# Patient Record
Sex: FEMALE | Race: WHITE | URBAN - METROPOLITAN AREA
[De-identification: names, ages, dates, MRNs, and addresses within clinical notes are randomized per-mention and may not be internally consistent; named-entity substitution may affect disease eponyms.]

---

## 2018-07-05 ENCOUNTER — OUTPATIENT (OUTPATIENT)
Dept: OUTPATIENT SERVICES | Facility: HOSPITAL | Age: 43
LOS: 1 days | Discharge: HOME | End: 2018-07-05

## 2018-07-05 DIAGNOSIS — Z12.31 ENCOUNTER FOR SCREENING MAMMOGRAM FOR MALIGNANT NEOPLASM OF BREAST: ICD-10-CM

## 2018-08-30 PROBLEM — Z00.00 ENCOUNTER FOR PREVENTIVE HEALTH EXAMINATION: Status: ACTIVE | Noted: 2018-08-30

## 2018-09-04 ENCOUNTER — OUTPATIENT (OUTPATIENT)
Dept: OUTPATIENT SERVICES | Facility: HOSPITAL | Age: 43
LOS: 1 days | Discharge: HOME | End: 2018-09-04
Payer: MEDICAID

## 2018-09-04 ENCOUNTER — RESULT REVIEW (OUTPATIENT)
Age: 43
End: 2018-09-04

## 2018-09-04 VITALS
DIASTOLIC BLOOD PRESSURE: 59 MMHG | HEART RATE: 73 BPM | SYSTOLIC BLOOD PRESSURE: 109 MMHG | OXYGEN SATURATION: 98 % | WEIGHT: 138.89 LBS | RESPIRATION RATE: 18 BRPM | HEIGHT: 64.96 IN | TEMPERATURE: 99 F

## 2018-09-04 VITALS
DIASTOLIC BLOOD PRESSURE: 69 MMHG | SYSTOLIC BLOOD PRESSURE: 107 MMHG | RESPIRATION RATE: 18 BRPM | OXYGEN SATURATION: 100 % | HEART RATE: 82 BPM

## 2018-09-04 DIAGNOSIS — Z98.891 HISTORY OF UTERINE SCAR FROM PREVIOUS SURGERY: Chronic | ICD-10-CM

## 2018-09-04 PROCEDURE — 58558 HYSTEROSCOPY BIOPSY: CPT

## 2018-09-04 RX ORDER — IBUPROFEN 200 MG
1 TABLET ORAL
Qty: 12 | Refills: 0 | OUTPATIENT
Start: 2018-09-04 | End: 2018-09-06

## 2018-09-04 RX ORDER — MORPHINE SULFATE 50 MG/1
2 CAPSULE, EXTENDED RELEASE ORAL
Qty: 0 | Refills: 0 | Status: DISCONTINUED | OUTPATIENT
Start: 2018-09-04 | End: 2018-09-04

## 2018-09-04 RX ORDER — SODIUM CHLORIDE 9 MG/ML
1000 INJECTION, SOLUTION INTRAVENOUS
Qty: 0 | Refills: 0 | Status: DISCONTINUED | OUTPATIENT
Start: 2018-09-04 | End: 2018-09-19

## 2018-09-04 RX ORDER — IRON,CARBONYL 45 MG
1 TABLET ORAL
Qty: 0 | Refills: 0 | COMMUNITY

## 2018-09-04 RX ORDER — CHOLECALCIFEROL (VITAMIN D3) 125 MCG
0 CAPSULE ORAL
Qty: 0 | Refills: 0 | COMMUNITY

## 2018-09-04 RX ORDER — ONDANSETRON 8 MG/1
4 TABLET, FILM COATED ORAL ONCE
Qty: 0 | Refills: 0 | Status: DISCONTINUED | OUTPATIENT
Start: 2018-09-04 | End: 2018-09-19

## 2018-09-04 RX ORDER — OXYCODONE AND ACETAMINOPHEN 5; 325 MG/1; MG/1
1 TABLET ORAL EVERY 4 HOURS
Qty: 0 | Refills: 0 | Status: DISCONTINUED | OUTPATIENT
Start: 2018-09-04 | End: 2018-09-04

## 2018-09-04 RX ADMIN — SODIUM CHLORIDE 100 MILLILITER(S): 9 INJECTION, SOLUTION INTRAVENOUS at 13:44

## 2018-09-04 RX ADMIN — SODIUM CHLORIDE 100 MILLILITER(S): 9 INJECTION, SOLUTION INTRAVENOUS at 13:15

## 2018-09-04 NOTE — PRE-ANESTHESIA EVALUATION ADULT - NSANTHOSAYNRD_GEN_A_CORE
No. RAISA screening performed.  STOP BANG Legend: 0-2 = LOW Risk; 3-4 = INTERMEDIATE Risk; 5-8 = HIGH Risk

## 2018-09-04 NOTE — BRIEF OPERATIVE NOTE - PROCEDURE
<<-----Click on this checkbox to enter Procedure Dilation and curettage  09/04/2018    Active  MMOWZOON

## 2018-09-04 NOTE — ASU DISCHARGE PLAN (ADULT/PEDIATRIC). - MEDICATION SUMMARY - MEDICATIONS TO TAKE
I will START or STAY ON the medications listed below when I get home from the hospital:    ibuprofen 600 mg oral tablet  -- 1 tab(s) by mouth every 6 hours   -- Do not take this drug if you are pregnant.  It is very important that you take or use this exactly as directed.  Do not skip doses or discontinue unless directed by your doctor.  May cause drowsiness or dizziness.  Obtain medical advice before taking any non-prescription drugs as some may affect the action of this medication.  Take with food or milk.    -- Indication: For take only if you are having pain

## 2018-09-04 NOTE — BRIEF OPERATIVE NOTE - OPERATION/FINDINGS
examination under anesthesia revealed a normal sized, anteverted uterus with normal contour and normal bilateral adnexa; hysteroscopy showed pedunculated polyps on the anterior and posterior wall, uterine cavity and bilateral tubal ostia appeared to be normal.

## 2018-09-04 NOTE — H&P PST ADULT - ASSESSMENT
41yo  with Abnormal uterine bleeding, undergoing D+C, hysteroscopy today. Follow up in 2 weeks with pelvic rest instructions.

## 2018-09-04 NOTE — ASU DISCHARGE PLAN (ADULT/PEDIATRIC). - SPECIAL INSTRUCTIONS
nothing in the vagina for 2 weeks, no tampons, no douching, no baths, no sex. Showers are fine.   Go to the emergency room if you have a temperature greater than 100.4, worsening abdominal pain or increased vaginal bleeding

## 2018-09-04 NOTE — H&P PST ADULT - HISTORY OF PRESENT ILLNESS
41yo  with history of abnormal uterine bleeding, reports prolonged menstrual cycles with increased flow, undergoing D+C Hysteroscopy today. Patient has a history of CSX2, and hemorrhoidectomy, and denies SAB/ETOP. No significant PMH.

## 2018-09-04 NOTE — H&P PST ADULT - ATTENDING COMMENTS
pt seen and examined   pt with hx of dub  thickened ee stripe, likely a polyp  for hysteroscopy and d&c

## 2018-09-04 NOTE — CHART NOTE - NSCHARTNOTEFT_GEN_A_CORE
PACU ANESTHESIA ADMISSION NOTE      Procedure: D&c hysteroscopy  Post op diagnosis:  uterine polyps    ____  Intubated  TV:______       Rate: ______      FiO2: ______    _x___  Patent Airway    ___x_  Full return of protective reflexes    ____x  Full recovery from anesthesia / back to baseline     Vitals:   T:      97     R:  14                BP:        126/76          Sat:     99              P: 105      Mental Status:  ___x_ Awake   _____ Alert   _____ Drowsy   _____ Sedated    Nausea/Vomiting:  ___x_ NO  ______Yes,   See Post - Op Orders          Pain Scale (0-10):  _____    Treatment: ____ None    ___x_ See Post - Op/PCA Orders    Post - Operative Fluids:   ____ Oral   __x__See Post - Op Orders    Plan: Discharge:   __x__Home       _____Floor     _____Critical Care    _____  Other:_________________    Comments: Discharge home when criteria met

## 2018-09-05 LAB — SURGICAL PATHOLOGY STUDY: SIGNIFICANT CHANGE UP

## 2018-09-07 DIAGNOSIS — N84.0 POLYP OF CORPUS UTERI: ICD-10-CM

## 2018-09-07 DIAGNOSIS — N93.9 ABNORMAL UTERINE AND VAGINAL BLEEDING, UNSPECIFIED: ICD-10-CM

## 2018-09-07 DIAGNOSIS — N92.0 EXCESSIVE AND FREQUENT MENSTRUATION WITH REGULAR CYCLE: ICD-10-CM

## 2019-10-02 ENCOUNTER — OUTPATIENT (OUTPATIENT)
Dept: OUTPATIENT SERVICES | Facility: HOSPITAL | Age: 44
LOS: 1 days | Discharge: HOME | End: 2019-10-02
Payer: MEDICAID

## 2019-10-02 DIAGNOSIS — Z12.31 ENCOUNTER FOR SCREENING MAMMOGRAM FOR MALIGNANT NEOPLASM OF BREAST: ICD-10-CM

## 2019-10-02 DIAGNOSIS — Z98.891 HISTORY OF UTERINE SCAR FROM PREVIOUS SURGERY: Chronic | ICD-10-CM

## 2019-10-02 PROCEDURE — 77063 BREAST TOMOSYNTHESIS BI: CPT | Mod: 26

## 2019-10-02 PROCEDURE — 77067 SCR MAMMO BI INCL CAD: CPT | Mod: 26

## 2020-01-01 NOTE — ASU DISCHARGE PLAN (ADULT/PEDIATRIC). - COMMENTS
Benefits, risks, and possible complications of procedure explained to patient/caregiver who verbalized understanding and gave written consent.
Make an appointment with Dr. Tijerina or Dr. Pittman

## 2020-01-22 NOTE — ASU PREOP CHECKLIST - ALLERGIES REVIEWED
Detail Level: Detailed Add 26783 Cpt? (Important Note: In 2017 The Use Of 56589 Is Being Tracked By Cms To Determine Future Global Period Reimbursement For Global Periods): no Additional Comments: PT LOOKS TO HAVE DISTAL TIP NECROSIS. WOUND CARE DISCUSSED. WILL SEE THE PATIENT BACK IN 6 WEEKS FOR EVAL done

## 2021-12-28 ENCOUNTER — EMERGENCY (EMERGENCY)
Facility: HOSPITAL | Age: 46
LOS: 0 days | Discharge: HOME | End: 2021-12-28
Attending: EMERGENCY MEDICINE | Admitting: EMERGENCY MEDICINE
Payer: COMMERCIAL

## 2021-12-28 VITALS
DIASTOLIC BLOOD PRESSURE: 86 MMHG | HEART RATE: 83 BPM | HEIGHT: 64.96 IN | SYSTOLIC BLOOD PRESSURE: 128 MMHG | RESPIRATION RATE: 20 BRPM | TEMPERATURE: 99 F | OXYGEN SATURATION: 98 % | WEIGHT: 156.09 LBS

## 2021-12-28 DIAGNOSIS — J02.9 ACUTE PHARYNGITIS, UNSPECIFIED: ICD-10-CM

## 2021-12-28 DIAGNOSIS — M79.10 MYALGIA, UNSPECIFIED SITE: ICD-10-CM

## 2021-12-28 DIAGNOSIS — R05.1 ACUTE COUGH: ICD-10-CM

## 2021-12-28 DIAGNOSIS — Z98.891 HISTORY OF UTERINE SCAR FROM PREVIOUS SURGERY: Chronic | ICD-10-CM

## 2021-12-28 DIAGNOSIS — U07.1 COVID-19: ICD-10-CM

## 2021-12-28 LAB — SARS-COV-2 RNA SPEC QL NAA+PROBE: DETECTED

## 2021-12-28 PROCEDURE — 99285 EMERGENCY DEPT VISIT HI MDM: CPT

## 2021-12-28 RX ORDER — DEXAMETHASONE 0.5 MG/5ML
10 ELIXIR ORAL ONCE
Refills: 0 | Status: COMPLETED | OUTPATIENT
Start: 2021-12-28 | End: 2021-12-28

## 2021-12-28 RX ADMIN — Medication 10 MILLIGRAM(S): at 12:27

## 2021-12-28 NOTE — ED PROVIDER NOTE - NSFOLLOWUPCLINICS_GEN_ALL_ED_FT
[facility] [place] [address]  Phone: [phone]  Fax:

## 2021-12-28 NOTE — ED PROVIDER NOTE - PROGRESS NOTE DETAILS
ATTENDING NOTE:   47 y/o F presents for evaluation of myalgias and sore throat. On exam: NAD, OP clear without erythema, CTAB, S1S2, soft NT abdomen, grossly neurologically intact.

## 2021-12-28 NOTE — ED PROVIDER NOTE - PATIENT PORTAL LINK FT
You can access the FollowMyHealth Patient Portal offered by [facility] by registering at the following website: [email or web address]. By joining [employer]’s FollowMyHealth portal, you will also be able to view your health information using other applications (apps) compatible with our system.

## 2021-12-28 NOTE — ED PROVIDER NOTE - OBJECTIVE STATEMENT
47 y/o p/w cough, sore throat, myalgias x 2 days, persistent, denies ear pain, abdominal pain, n/v/d, respiratory sx, change in bowel habits or urinary sx, rash or other associated complaints at present.

## 2021-12-28 NOTE — ED PROVIDER NOTE - NSFOLLOWUPINSTRUCTIONS_ED_ALL_ED_FT
COVID-19 (Coronavirus Disease 2019)    WHAT YOU NEED TO KNOW:    COVID-19 is the disease caused by a coronavirus first discovered in December 2019. Coronaviruses generally cause upper respiratory (nose, throat, and lung) infections, such as a cold. The 2019 virus spreads quickly and easily. It can be spread starting 2 to 3 days before symptoms even begin.    DISCHARGE INSTRUCTIONS:    Call your local emergency number (911 in the ) if:   •You have trouble breathing or shortness of breath at rest.      •You have chest pain or pressure that lasts longer than 5 minutes.      •You become confused or hard to wake.      •Your lips or face are blue.      Return to the emergency department if:   •You have a fever of 104°F (40°C) or higher.          Call your doctor if:   •You have symptoms of COVID-19.      •You have questions or concerns about your condition or care.      Medicines: You may need any of the following:   •Decongestants help reduce nasal congestion and help you breathe more easily. If you take decongestant pills, they may make you feel restless or cause problems with your sleep. Do not use decongestant sprays for more than a few days.      •Cough suppressants help reduce coughing. Ask your healthcare provider which type of cough medicine is best for you.      •Acetaminophen decreases pain and fever. It is available without a doctor's order. Ask how much to take and how often to take it. Follow directions. Read the labels of all other medicines you are using to see if they also contain acetaminophen, or ask your doctor or pharmacist. Acetaminophen can cause liver damage if not taken correctly. Do not use more than 4 grams (4,000 milligrams) total of acetaminophen in one day.       •NSAIDs, such as ibuprofen, help decrease swelling, pain, and fever. This medicine is available with or without a doctor's order. NSAIDs can cause stomach bleeding or kidney problems in certain people. If you take blood thinner medicine, always ask your healthcare provider if NSAIDs are safe for you. Always read the medicine label and follow directions.      •Blood thinners help prevent blood clots. Clots can cause strokes, heart attacks, and death. The following are general safety guidelines to follow while you are taking a blood thinner:?Watch for bleeding and bruising while you take blood thinners. Watch for bleeding from your gums or nose. Watch for blood in your urine and bowel movements. Use a soft washcloth on your skin, and a soft toothbrush to brush your teeth. This can keep your skin and gums from bleeding. If you shave, use an electric shaver. Do not play contact sports.       ?Tell your dentist and other healthcare providers that you take a blood thinner. Wear a bracelet or necklace that says you take this medicine.       ?Do not start or stop any other medicines unless your healthcare provider tells you to. Many medicines cannot be used with blood thinners.      ?Take your blood thinner exactly as prescribed by your healthcare provider. Do not skip does or take less than prescribed. Tell your provider right away if you forget to take your blood thinner, or if you take too much.      ?Warfarin is a blood thinner that you may need to take. The following are things you should be aware of if you take warfarin: ?Foods and medicines can affect the amount of warfarin in your blood. Do not make major changes to your diet while you take warfarin. Warfarin works best when you eat about the same amount of vitamin K every day. Vitamin K is found in green leafy vegetables and certain other foods. Ask for more information about what to eat when you are taking warfarin.      ?You will need to see your healthcare provider for follow-up visits when you are on warfarin. You will need regular blood tests. These tests are used to decide how much medicine you need.         •Take your medicine as directed. Contact your healthcare provider if you think your medicine is not helping or if you have side effects. Tell him or her if you are allergic to any medicine. Keep a list of the medicines, vitamins, and herbs you take. Include the amounts, and when and why you take them. Bring the list or the pill bottles to follow-up visits. Carry your medicine list with you in case of an emergency.      What you need to know about variants: The virus has changed into several new forms (called variants) since it was discovered. The variants may be more contagious (easily spread) than the original form. Some may also cause more severe illness than others.    What you need to know about COVID-19 vaccines: Healthcare providers recommend a COVID-19 vaccine, even if you have already had COVID-19. You are considered fully vaccinated against COVID-19 two weeks after the final dose of any COVID-19 vaccine. Let your healthcare provider know when you have received the final dose of the vaccine. Make a copy of your vaccination card. Keep the original with you in case you need to show it. Keep the copy in a safe place.  •COVID-19 vaccines are given as a shot in 1 or 2 doses. The vaccine is recommended for everyone 12 years or older.      •A third dose is recommended for adults with a weakened immune system who get a 2-dose vaccine. The third dose is given at least 28 days after the second.      •A booster (additional) dose is being recommended for other people as well. This is usually given 6 months after the initial doses are complete. Continue social distancing and other measures, even after you get the vaccine. Although it is not common, you can become infected after you get the vaccine. You may also be able to pass the virus to others without knowing you are infected.      •After you get the vaccine, check local, national, and international travel rules. You may need to be tested before you travel. Some countries require proof of a negative test before you travel. You may also need to quarantine after you return.      How the 2019 coronavirus spreads:   •Droplets are the main way all coronaviruses spread. The virus travels in droplets that form when a person talks, sings, coughs, or sneezes. The droplets can also float in the air for minutes or hours. Infection happens when you breathe in the droplets or get them in your eyes or nose. Close personal contact with an infected person increases your risk for infection. This means being within 6 feet (2 meters) of the person for at least 15 minutes over 24 hours.      •Person-to-person contact can spread the virus. For example, a person with the virus on his or her hands can spread it by shaking hands with someone.      •The virus can stay on objects and surfaces for up to 3 days. You may become infected by touching the object or surface and then touching your eyes or mouth.      Help lower the risk for COVID-19:   •Wash your hands often throughout the day. Use soap and water. Rub your soapy hands together, lacing your fingers, for at least 20 seconds. Rinse with warm, running water. Dry your hands with a clean towel or paper towel. Use hand  that contains alcohol if soap and water are not available. Teach children how to wash their hands and use hand .  Handwashing           •Cover sneezes and coughs. Turn your face away and cover your mouth and nose with a tissue. Throw the tissue away. Use the bend of your arm if a tissue is not available. Then wash your hands well with soap and water or use hand . Teach children how to cover a cough or sneeze.      •Wear a face covering (mask) when needed. Use a cloth covering with at least 2 layers. You can also create layers by putting a cloth covering over a disposable non-medical mask. Cover your mouth and your nose.  How to Wear a Face Covering (Mask)           •Follow worldwide, national, and local social distancing guidelines. Keep at least 6 feet (2 meters) between you and others.      •Try not to touch your face. If you get the virus on your hands, you can transfer it to your eyes, nose, or mouth and become infected. You can also transfer it to objects, surfaces, or people.      •Clean and disinfect high-touch surfaces and objects often. Use disinfecting wipes, or make a solution of 4 teaspoons of bleach in 1 quart (4 cups) of water.      •Ask about other vaccines you may need. Get the influenza (flu) vaccine as soon as recommended each year, usually starting in September or October. Get the pneumonia vaccine if recommended. Your healthcare provider can tell you if you should also get other vaccines, and when to get them.    Prevent COVID-19 Infection         Follow social distancing guidelines: National and local social distancing rules vary. Rules and restrictions may change over time as restrictions are lifted. The following are general guidelines:   •Stay home if you are sick or think you may have COVID-19. It is important to stay home if you are waiting for a testing appointment or for test results.      •Avoid close physical contact with anyone who does not live in your home. Do not shake hands with, hug, or kiss a person as a greeting. If you must use public transportation (such as a bus or subway), try to sit or stand away from others. Wear your face covering.      •Avoid in-person gatherings and crowds. Attend virtually if possible.      For more information:   •Centers for Disease Control and Prevention  [address] [place] [address]  Phone: 1-747.179.7199  Web Address: http://www.cdc.gov        Follow up with your doctor as directed: Write down your questions so you remember to ask them during your visits.

## 2023-05-12 ENCOUNTER — OUTPATIENT (OUTPATIENT)
Dept: OUTPATIENT SERVICES | Facility: HOSPITAL | Age: 48
LOS: 1 days | End: 2023-05-12
Payer: COMMERCIAL

## 2023-05-12 DIAGNOSIS — Z98.891 HISTORY OF UTERINE SCAR FROM PREVIOUS SURGERY: Chronic | ICD-10-CM

## 2023-05-12 DIAGNOSIS — Z12.31 ENCOUNTER FOR SCREENING MAMMOGRAM FOR MALIGNANT NEOPLASM OF BREAST: ICD-10-CM

## 2023-05-12 PROCEDURE — 77063 BREAST TOMOSYNTHESIS BI: CPT

## 2023-05-12 PROCEDURE — 77063 BREAST TOMOSYNTHESIS BI: CPT | Mod: 26

## 2023-05-12 PROCEDURE — 77067 SCR MAMMO BI INCL CAD: CPT

## 2023-05-12 PROCEDURE — 77067 SCR MAMMO BI INCL CAD: CPT | Mod: 26

## 2023-05-13 DIAGNOSIS — Z12.31 ENCOUNTER FOR SCREENING MAMMOGRAM FOR MALIGNANT NEOPLASM OF BREAST: ICD-10-CM

## 2024-01-31 ENCOUNTER — APPOINTMENT (OUTPATIENT)
Dept: SPEECH THERAPY | Facility: CLINIC | Age: 49
End: 2024-01-31

## 2024-01-31 ENCOUNTER — OUTPATIENT (OUTPATIENT)
Dept: OUTPATIENT SERVICES | Facility: HOSPITAL | Age: 49
LOS: 1 days | End: 2024-01-31
Payer: COMMERCIAL

## 2024-01-31 DIAGNOSIS — Z98.891 HISTORY OF UTERINE SCAR FROM PREVIOUS SURGERY: Chronic | ICD-10-CM

## 2024-01-31 DIAGNOSIS — R42 DIZZINESS AND GIDDINESS: ICD-10-CM

## 2024-01-31 PROCEDURE — 92540 BASIC VESTIBULAR EVALUATION: CPT

## 2024-05-13 ENCOUNTER — OUTPATIENT (OUTPATIENT)
Dept: OUTPATIENT SERVICES | Facility: HOSPITAL | Age: 49
LOS: 1 days | End: 2024-05-13
Payer: COMMERCIAL

## 2024-05-13 DIAGNOSIS — Z98.891 HISTORY OF UTERINE SCAR FROM PREVIOUS SURGERY: Chronic | ICD-10-CM

## 2024-05-13 DIAGNOSIS — Z12.31 ENCOUNTER FOR SCREENING MAMMOGRAM FOR MALIGNANT NEOPLASM OF BREAST: ICD-10-CM

## 2024-05-13 PROCEDURE — 77063 BREAST TOMOSYNTHESIS BI: CPT | Mod: 26

## 2024-05-13 PROCEDURE — 77067 SCR MAMMO BI INCL CAD: CPT | Mod: 26

## 2024-05-13 PROCEDURE — 77067 SCR MAMMO BI INCL CAD: CPT

## 2024-05-13 PROCEDURE — 77063 BREAST TOMOSYNTHESIS BI: CPT

## 2024-05-14 DIAGNOSIS — Z12.31 ENCOUNTER FOR SCREENING MAMMOGRAM FOR MALIGNANT NEOPLASM OF BREAST: ICD-10-CM

## 2024-06-29 ENCOUNTER — OUTPATIENT (OUTPATIENT)
Dept: OUTPATIENT SERVICES | Facility: HOSPITAL | Age: 49
LOS: 1 days | End: 2024-06-29
Payer: COMMERCIAL

## 2024-06-29 DIAGNOSIS — Z98.891 HISTORY OF UTERINE SCAR FROM PREVIOUS SURGERY: Chronic | ICD-10-CM

## 2024-06-29 DIAGNOSIS — N63.10 UNSPECIFIED LUMP IN THE RIGHT BREAST, UNSPECIFIED QUADRANT: ICD-10-CM

## 2024-06-29 DIAGNOSIS — R92.8 OTHER ABNORMAL AND INCONCLUSIVE FINDINGS ON DIAGNOSTIC IMAGING OF BREAST: ICD-10-CM

## 2024-06-29 PROCEDURE — 77065 DX MAMMO INCL CAD UNI: CPT | Mod: RT

## 2024-06-29 PROCEDURE — G0279: CPT

## 2024-06-29 PROCEDURE — 77061 BREAST TOMOSYNTHESIS UNI: CPT | Mod: 26

## 2024-06-29 PROCEDURE — 77065 DX MAMMO INCL CAD UNI: CPT | Mod: 26,RT

## 2024-06-29 PROCEDURE — 76642 ULTRASOUND BREAST LIMITED: CPT | Mod: RT

## 2024-06-29 PROCEDURE — 76642 ULTRASOUND BREAST LIMITED: CPT | Mod: 26,RT

## 2024-06-30 DIAGNOSIS — R92.8 OTHER ABNORMAL AND INCONCLUSIVE FINDINGS ON DIAGNOSTIC IMAGING OF BREAST: ICD-10-CM

## 2024-10-09 ENCOUNTER — OUTPATIENT (OUTPATIENT)
Dept: OUTPATIENT SERVICES | Facility: HOSPITAL | Age: 49
LOS: 1 days | End: 2024-10-09
Payer: COMMERCIAL

## 2024-10-09 DIAGNOSIS — Z98.891 HISTORY OF UTERINE SCAR FROM PREVIOUS SURGERY: Chronic | ICD-10-CM

## 2024-10-09 DIAGNOSIS — R92.8 OTHER ABNORMAL AND INCONCLUSIVE FINDINGS ON DIAGNOSTIC IMAGING OF BREAST: ICD-10-CM

## 2024-10-09 PROCEDURE — A9579: CPT

## 2024-10-09 PROCEDURE — 77049 MRI BREAST C-+ W/CAD BI: CPT

## 2024-10-09 PROCEDURE — 77049 MRI BREAST C-+ W/CAD BI: CPT | Mod: 26

## 2024-10-09 PROCEDURE — C8937: CPT

## 2024-10-10 DIAGNOSIS — R92.8 OTHER ABNORMAL AND INCONCLUSIVE FINDINGS ON DIAGNOSTIC IMAGING OF BREAST: ICD-10-CM

## 2025-02-20 ENCOUNTER — APPOINTMENT (OUTPATIENT)
Dept: ORTHOPEDIC SURGERY | Facility: CLINIC | Age: 50
End: 2025-02-20
Payer: COMMERCIAL

## 2025-02-20 DIAGNOSIS — M75.31 CALCIFIC TENDINITIS OF RIGHT SHOULDER: ICD-10-CM

## 2025-02-20 PROCEDURE — 99203 OFFICE O/P NEW LOW 30 MIN: CPT

## 2025-03-13 ENCOUNTER — APPOINTMENT (OUTPATIENT)
Dept: ORTHOPEDIC SURGERY | Facility: CLINIC | Age: 50
End: 2025-03-13
Payer: COMMERCIAL

## 2025-03-13 DIAGNOSIS — M75.31 CALCIFIC TENDINITIS OF RIGHT SHOULDER: ICD-10-CM

## 2025-03-13 PROCEDURE — 73030 X-RAY EXAM OF SHOULDER: CPT | Mod: RT

## 2025-03-13 PROCEDURE — 99213 OFFICE O/P EST LOW 20 MIN: CPT | Mod: 25

## 2025-03-13 PROCEDURE — 20610 DRAIN/INJ JOINT/BURSA W/O US: CPT | Mod: RT

## 2025-05-13 ENCOUNTER — APPOINTMENT (OUTPATIENT)
Dept: ORTHOPEDIC SURGERY | Facility: CLINIC | Age: 50
End: 2025-05-13
Payer: COMMERCIAL

## 2025-05-13 DIAGNOSIS — M75.31 CALCIFIC TENDINITIS OF RIGHT SHOULDER: ICD-10-CM

## 2025-05-13 PROCEDURE — 99203 OFFICE O/P NEW LOW 30 MIN: CPT

## 2025-06-30 ENCOUNTER — OUTPATIENT (OUTPATIENT)
Dept: OUTPATIENT SERVICES | Facility: HOSPITAL | Age: 50
LOS: 1 days | End: 2025-06-30
Payer: COMMERCIAL

## 2025-06-30 DIAGNOSIS — Z12.31 ENCOUNTER FOR SCREENING MAMMOGRAM FOR MALIGNANT NEOPLASM OF BREAST: ICD-10-CM

## 2025-06-30 DIAGNOSIS — Z98.891 HISTORY OF UTERINE SCAR FROM PREVIOUS SURGERY: Chronic | ICD-10-CM

## 2025-06-30 PROCEDURE — 77067 SCR MAMMO BI INCL CAD: CPT | Mod: 26

## 2025-06-30 PROCEDURE — 77063 BREAST TOMOSYNTHESIS BI: CPT | Mod: 26

## 2025-06-30 PROCEDURE — 77067 SCR MAMMO BI INCL CAD: CPT

## 2025-06-30 PROCEDURE — 77063 BREAST TOMOSYNTHESIS BI: CPT

## 2025-07-01 DIAGNOSIS — Z12.31 ENCOUNTER FOR SCREENING MAMMOGRAM FOR MALIGNANT NEOPLASM OF BREAST: ICD-10-CM

## 2025-07-15 ENCOUNTER — EMERGENCY (EMERGENCY)
Facility: HOSPITAL | Age: 50
LOS: 0 days | Discharge: HOME | End: 2025-07-15
Attending: EMERGENCY MEDICINE
Payer: COMMERCIAL

## 2025-07-15 VITALS
WEIGHT: 165.79 LBS | TEMPERATURE: 98 F | DIASTOLIC BLOOD PRESSURE: 76 MMHG | HEART RATE: 74 BPM | HEIGHT: 65 IN | RESPIRATION RATE: 18 BRPM | SYSTOLIC BLOOD PRESSURE: 114 MMHG | OXYGEN SATURATION: 98 %
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
## 2025-07-15 DIAGNOSIS — Z23 ENCOUNTER FOR IMMUNIZATION: ICD-10-CM

## 2025-07-15 DIAGNOSIS — S61.216A LACERATION WITHOUT FOREIGN BODY OF RIGHT LITTLE FINGER WITHOUT DAMAGE TO NAIL, INITIAL ENCOUNTER: ICD-10-CM

## 2025-07-15 PROCEDURE — 12031 INTMD RPR S/A/T/EXT 2.5 CM/<: CPT

## 2025-07-15 PROCEDURE — 90715 TDAP VACCINE 7 YRS/> IM: CPT

## 2025-07-15 PROCEDURE — 12001 RPR S/N/AX/GEN/TRNK 2.5CM/<: CPT

## 2025-07-15 PROCEDURE — 99283 EMERGENCY DEPT VISIT LOW MDM: CPT | Mod: 25

## 2025-07-15 PROCEDURE — 99284 EMERGENCY DEPT VISIT MOD MDM: CPT | Mod: 25

## 2025-07-15 PROCEDURE — 90471 IMMUNIZATION ADMIN: CPT

## 2025-07-15 RX ORDER — CEPHALEXIN 250 MG/1
1 CAPSULE ORAL
Qty: 15 | Refills: 0
Start: 2025-07-15 | End: 2025-07-19

## 2025-07-15 RX ORDER — ACETAMINOPHEN 500 MG/5ML
650 LIQUID (ML) ORAL ONCE
Refills: 0 | Status: COMPLETED | OUTPATIENT
Start: 2025-07-15 | End: 2025-07-15

## 2025-07-15 NOTE — ED PROVIDER NOTE - PATIENT PORTAL LINK FT
You can access the FollowMyHealth Patient Portal offered by [facility] by registering at the following website: [email or web address]. By joining [email or web address]’s FollowMyHealth portal, you will also be able to view your health information using other applications (apps) compatible with our system.

## 2025-07-15 NOTE — ED PROVIDER NOTE - PHYSICAL EXAMINATION
CONSTITUTIONAL: well developed, nontoxic appearing, in no acute distress, speaking in full sentences  SKIN: warm, dry, no rash, cap refill < 2 seconds. 1.5cm superficial crescent shaped laceration to R. pinky finger, not actively bleeding, no overlying or surrounding erythema, edema, or purulent drainage. well approximated. distal innervation intact.   HEENT: normocephalic, atraumatic, no conjunctival erythema, moist mucous membranes, patent airway  NECK: supple  CV:  regular rate, regular rhythm, 2+ radial pulses bilaterally  RESP: no wheezes, no rales, no rhonchi, normal work of breathing  NEURO: alert, oriented, grossly unremarkable  PSYCH: cooperative, appropriate

## 2025-07-15 NOTE — ED PROVIDER NOTE - DIFFERENTIAL DIAGNOSIS
Differential Diagnosis The differential diagnosis for patients clinical presentation includes but is not limited to:  laceration  cellulitis

## 2025-07-15 NOTE — ED PROVIDER NOTE - NSFOLLOWUPINSTRUCTIONS_ED_ALL_ED_FT
PLEASE RETURN HERE OR YOUR PMD FOR SUTURE REMOVAL IN 7-10 DAYS.    Laceration    A laceration is a cut that goes through all of the layers of the skin and into the tissue that is right under the skin. Some lacerations heal on their own. Others need to be closed with skin adhesive strips, skin glue, stitches (sutures), or staples. Proper laceration care minimizes the risk of infection and helps the laceration to heal better.  If non-absorbable stitches or staples have been placed, they must be taken out within the time frame instructed by your healthcare provider.    SEEK IMMEDIATE MEDICAL CARE IF YOU HAVE ANY OF THE FOLLOWING SYMPTOMS: swelling around the wound, worsening pain, drainage from the wound, red streaking going away from your wound, inability to move finger or toe near the laceration, or discoloration of skin near the laceration.

## 2025-07-15 NOTE — ED PROVIDER NOTE - CLINICAL SUMMARY MEDICAL DECISION MAKING FREE TEXT BOX
49-year-old female with no significant past medical history presents with right medial pinky laceration after cutting it on glass yesterday evening, rinsed it out came to work and was told  to come to the ED.  Patient denies any numbness or tingling or decreased range of motion.  Unsure of last tetanus. Pt states no broken glass and no foreign body.    No fever, chills, nausea, vomiting, numbness/tingling, decreased sensation, discharge, hearing a click/feeling a pop, or any trauma.     on exam: Non toxic appearing pt. no swelling and erythema, fluctuance. R medial mid fifth phalanx ~ 2 cm V- shaped laceration, well approximated, no tendon rupture in a blood-less field. No foreign body. No ecchymoses. No crepitus. Radial pulses 2/4 b/l. Cap refill < 2 seconds, No obvious bony deformity. Good finger opposition, FROM of R wrist in flexion, extension, ulna and radial deviation. No snuff box tenderness.    FROM of R fifth  finger in flexion, extension at PIP, DIP and MCP, no pain to palpation to finger pad, no pain to palpation along tendon sheath, finger not held in flexion, no fusiform swelling, no pain with passive extension FROM of R elbow in flexion, extensions, supination and pronation, and R shoulder in flexion, extension, abduction, and adduction with no pain to palpation.    Plan; Tdap, wound irrigation, laceration repair.     Appropriate medications for patient's presenting complaints were ordered and effects were reassessed.  Patient's records (prior ED visita) were reviewed.  Escalation to admission/observation was considered. However patient feels much better and is comfortable with discharge.  Appropriate follow-up was arranged.  Supportive care and home care discussed in detail. Patient aware they may have to return for re-evaluation and possible admission if outpatient treatment fails. Strict return precautions discussed.  Patient n/v intact with Full ROM and full motor strength with no signs of any serious injury. No signs of tendon injury on direct examination. Tetanus up-to-date.  Laceration repaired as described.  Wound care discussed in detail. Signs of infection discussed. Timing and return for suture removal discussed. Medications administered and prescribed/OTC home meds discussed.  All questions and concerns from patient or family addressed. Understanding of instructions verbalized. 49-year-old female with no significant past medical history presents with right medial pinky laceration after cutting it on glass yesterday evening, rinsed it out came to work and was told  to come to the ED.  Patient denies any numbness or tingling or decreased range of motion.  Unsure of last tetanus.Pt states no broken glass and no foreign body.    No fever, chills, nausea, vomiting, numbness/tingling, decreased sensation, discharge, hearing a click/feeling a pop, or any trauma.     on exam: Non toxic appearing pt. no swelling and erythema, fluctuance. R medial mid fifth phalanx ~ 2 cm V- shaped laceration only  on one side, other side is superficial, well approximated, no tendon rupture in a blood-less field. No foreign body. No ecchymoses. No crepitus. Radial pulses 2/4 b/l. Cap refill < 2 seconds, No obvious bony deformity. Good finger opposition, FROM of R wrist in flexion, extension, ulna and radial deviation. No snuff box tenderness.    FROM of R fifth  finger in flexion, extension at PIP, DIP and MCP, no pain to palpation to finger pad, no pain to palpation along tendon sheath, finger not held in flexion, no fusiform swelling, no pain with passive extension FROM of R elbow in flexion, extensions, supination and pronation, and R shoulder in flexion, extension, abduction, and adduction with no pain to palpation.    Plan; Tdap, wound irrigation, laceration repair.     Appropriate medications for patient's presenting complaints were ordered and effects were reassessed.  Patient's records (prior ED visits) were reviewed.  Escalation to admission/observation was considered. However patient feels much better and is comfortable with discharge.  Appropriate follow-up was arranged.  Supportive care and home care discussed in detail. Patient aware they may have to return for re-evaluation and possible admission if outpatient treatment fails. Strict return precautions discussed.  Patient n/v intact with Full ROM and full motor strength with no signs of any serious injury. No signs of tendon injury on direct examination. Tetanus up-to-date.  Laceration repaired as described.  Wound care discussed in detail. Signs of infection discussed. Timing and return for suture removal discussed. Medications administered and prescribed/OTC home meds discussed.  All questions and concerns from patient or family addressed. Understanding of instructions verbalized.

## 2025-07-15 NOTE — ED PROVIDER NOTE - OBJECTIVE STATEMENT
49F with no PMHx who presents to the ED for a laceration to her right pinky finger. Patient refers that last night she was washing dishes and cut her finger on a glass. Patient cleaned the wound and bandaged it. 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 Otherwise denies fevers, headache, chills, cp, sob, n/v/d, abd pain, back pain, changes in urinary/stool habitus, calf tenderness or swelling, recent travel. Uncertain of TDap vaccination status.

## 2025-07-15 NOTE — ED PROVIDER NOTE - ATTENDING CONTRIBUTION TO CARE
49-year-old female with no significant past medical history presents with right medial pinky laceration after cutting it on glass yesterday evening, rinsed it out came to work and was told  to come to the ED.  Patient denies any numbness or tingling or decreased range of motion.  Unsure of last tetanus.Pt states no broken glass and no foreign body.    No fever, chills, nausea, vomiting, numbness/tingling, decreased sensation, discharge, hearing a click/feeling a pop, or any trauma.     on exam: Non toxic appearing pt. no swelling and erythema, fluctuance. R medial mid fifth phalanx ~ 2 cm V- shaped laceration, well approximated, no tendon rupture in a blood-less field. No foreign body. No ecchymoses. No crepitus. Radial pulses 2/4 b/l. Cap refill < 2 seconds, No obvious bony deformity. Good finger opposition, FROM of R wrist in flexion, extension, ulna and radial deviation. No snuff box tenderness.    FROM of R fifth  finger in flexion, extension at PIP, DIP and MCP, no pain to palpation to finger pad, no pain to palpation along tendon sheath, finger not held in flexion, no fusiform swelling, no pain with passive extension FROM of R elbow in flexion, extensions, supination and pronation, and R shoulder in flexion, extension, abduction, and adduction with no pain to palpation.    Plan; Tdap, wound irrigation, laceration repair. 49-year-old female with no significant past medical history presents with right medial pinky laceration after cutting it on glass yesterday evening, rinsed it out came to work and was told  to come to the ED.  Patient denies any numbness or tingling or decreased range of motion.  Unsure of last tetanus.Pt states no broken glass and no foreign body.    No fever, chills, nausea, vomiting, numbness/tingling, decreased sensation, discharge, hearing a click/feeling a pop, or any trauma.     on exam: Non toxic appearing pt. no swelling and erythema, fluctuance. R medial mid fifth phalanx ~ 2 cm V- shaped laceration only  on one side, other side is superficial, well approximated, no tendon rupture in a blood-less field. No foreign body. No ecchymoses. No crepitus. Radial pulses 2/4 b/l. Cap refill < 2 seconds, No obvious bony deformity. Good finger opposition, FROM of R wrist in flexion, extension, ulna and radial deviation. No snuff box tenderness.    FROM of R fifth  finger in flexion, extension at PIP, DIP and MCP, no pain to palpation to finger pad, no pain to palpation along tendon sheath, finger not held in flexion, no fusiform swelling, no pain with passive extension FROM of R elbow in flexion, extensions, supination and pronation, and R shoulder in flexion, extension, abduction, and adduction with no pain to palpation.    Plan; Tdap, wound irrigation, laceration repair.

## 2025-07-15 NOTE — ED PROCEDURE NOTE - CPROC ED TIME OUT STATEMENT1
“Patient's name, , procedure and correct site were confirmed during the [place] Timeout.”
“Patient's name, , procedure and correct site were confirmed during the [place] Timeout.”

## 2025-07-15 NOTE — ED PROVIDER NOTE - PROGRESS NOTE DETAILS
ED Attending NILA Rodriguez  Patient n/v intact with Full ROM and full motor strength with no signs of any serious injury. No signs of tendon injury on direct examination. Tetanus up-to-date.  Laceration repaired as described.  Wound care discussed in detail. Signs of infection discussed. Timing and return for suture removal discussed. Medications administered and prescribed/OTC home meds discussed.  All questions and concerns from patient or family addressed. Understanding of instructions verbalized.

## 2025-07-15 NOTE — ED ADULT TRIAGE NOTE - CHIEF COMPLAINT QUOTE
Patient presents with laceration to the fifth digit on the R hand.  Unknown when last received Tetanus

## 2025-07-22 ENCOUNTER — EMERGENCY (EMERGENCY)
Facility: HOSPITAL | Age: 50
LOS: 0 days | Discharge: HOME | End: 2025-07-22
Attending: EMERGENCY MEDICINE
Payer: COMMERCIAL

## 2025-07-22 VITALS
RESPIRATION RATE: 18 BRPM | OXYGEN SATURATION: 100 % | DIASTOLIC BLOOD PRESSURE: 76 MMHG | HEIGHT: 65 IN | TEMPERATURE: 98 F | HEART RATE: 91 BPM | SYSTOLIC BLOOD PRESSURE: 115 MMHG | WEIGHT: 162.04 LBS

## 2025-07-22 DIAGNOSIS — S61.210D LACERATION WITHOUT FOREIGN BODY OF RIGHT INDEX FINGER WITHOUT DAMAGE TO NAIL, SUBSEQUENT ENCOUNTER: ICD-10-CM

## 2025-07-22 DIAGNOSIS — Z98.891 HISTORY OF UTERINE SCAR FROM PREVIOUS SURGERY: Chronic | ICD-10-CM

## 2025-07-22 PROCEDURE — L9995: CPT

## 2025-07-22 PROCEDURE — 99212 OFFICE O/P EST SF 10 MIN: CPT

## 2025-07-22 NOTE — ED PROVIDER NOTE - PATIENT PORTAL LINK FT
You can access the FollowMyHealth Patient Portal offered by [facility] by registering at the following website: [email or web address]. By joining [facility]’s FollowMyHealth portal, you will also be able to view your health information using other applications (apps) compatible with our system.

## 2025-07-22 NOTE — ED PROVIDER NOTE - CLINICAL SUMMARY MEDICAL DECISION MAKING FREE TEXT BOX
48 yo wmoan here for suture removal  sutures placed 1 week prior to R 2nd digit  no complaints    R second digit: well healed laceration. no erythema, warmth or ttp    48 yo woman for suture removal

## 2025-07-22 NOTE — ED PROVIDER NOTE - PHYSICAL EXAMINATION
CONSTITUTIONAL: Well-developed; well-nourished; in no acute distress.   SKIN: warm, dry, sutures to right index finger, no erythemia or dihisance   HEAD: Normocephalic; atraumatic.  EYES: PERRL, EOMI, no conjunctival erythema  ENT: No nasal discharge; airway clear.  NECK: Supple; non tender.  CARD: S1, S2 normal; no murmurs, gallops, or rubs. Regular rate and rhythm.   RESP: No wheezes, rales or rhonchi.  ABD: soft ntnd  EXT: Normal ROM.  No clubbing, cyanosis or edema.

## 2025-07-22 NOTE — ED PROVIDER NOTE - OBJECTIVE STATEMENT
49-year-old female past medical history as noted coming in for suture removal.  Patient reports no issues no redness fevers dehiscence no pus drainage from the site.

## 2025-07-27 ENCOUNTER — OUTPATIENT (OUTPATIENT)
Dept: OUTPATIENT SERVICES | Facility: HOSPITAL | Age: 50
LOS: 1 days | End: 2025-07-27
Payer: COMMERCIAL

## 2025-07-27 DIAGNOSIS — Z98.891 HISTORY OF UTERINE SCAR FROM PREVIOUS SURGERY: Chronic | ICD-10-CM

## 2025-07-27 DIAGNOSIS — R92.8 OTHER ABNORMAL AND INCONCLUSIVE FINDINGS ON DIAGNOSTIC IMAGING OF BREAST: ICD-10-CM

## 2025-07-27 PROCEDURE — 77049 MRI BREAST C-+ W/CAD BI: CPT | Mod: 26

## 2025-07-27 PROCEDURE — A9579: CPT

## 2025-07-27 PROCEDURE — 77049 MRI BREAST C-+ W/CAD BI: CPT

## 2025-07-27 PROCEDURE — C8937: CPT

## 2025-07-28 DIAGNOSIS — R92.8 OTHER ABNORMAL AND INCONCLUSIVE FINDINGS ON DIAGNOSTIC IMAGING OF BREAST: ICD-10-CM
